# Patient Record
Sex: MALE | Employment: PART TIME | URBAN - METROPOLITAN AREA
[De-identification: names, ages, dates, MRNs, and addresses within clinical notes are randomized per-mention and may not be internally consistent; named-entity substitution may affect disease eponyms.]

---

## 2021-01-06 ENCOUNTER — HOSPITAL ENCOUNTER (EMERGENCY)
Facility: CLINIC | Age: 66
Discharge: HOME | End: 2021-01-06
Attending: EMERGENCY MEDICINE | Admitting: EMERGENCY MEDICINE

## 2021-01-06 VITALS
HEART RATE: 90 BPM | OXYGEN SATURATION: 98 % | TEMPERATURE: 97.9 F | SYSTOLIC BLOOD PRESSURE: 148 MMHG | RESPIRATION RATE: 16 BRPM | DIASTOLIC BLOOD PRESSURE: 107 MMHG

## 2021-01-06 DIAGNOSIS — M17.12 OSTEOARTHRITIS OF LEFT KNEE, UNSPECIFIED OSTEOARTHRITIS TYPE: ICD-10-CM

## 2021-01-06 PROCEDURE — 99283 EMERGENCY DEPT VISIT LOW MDM: CPT

## 2021-01-06 PROCEDURE — 250N000013 HC RX MED GY IP 250 OP 250 PS 637: Performed by: EMERGENCY MEDICINE

## 2021-01-06 RX ORDER — HYDROCODONE BITARTRATE AND ACETAMINOPHEN 5; 325 MG/1; MG/1
2 TABLET ORAL ONCE
Status: COMPLETED | OUTPATIENT
Start: 2021-01-06 | End: 2021-01-06

## 2021-01-06 RX ORDER — HYDROCODONE BITARTRATE AND ACETAMINOPHEN 5; 325 MG/1; MG/1
1-2 TABLET ORAL EVERY 6 HOURS PRN
Qty: 10 TABLET | Refills: 0 | Status: SHIPPED | OUTPATIENT
Start: 2021-01-06

## 2021-01-06 RX ADMIN — HYDROCODONE BITARTRATE AND ACETAMINOPHEN 2 TABLET: 5; 325 TABLET ORAL at 16:40

## 2021-01-06 ASSESSMENT — ENCOUNTER SYMPTOMS
SHORTNESS OF BREATH: 0
COUGH: 0
JOINT SWELLING: 1
CHILLS: 0
ARTHRALGIAS: 1
FEVER: 0

## 2021-01-06 NOTE — ED NOTES
A/O. VSS.  Pt verbalized understanding of d/c instructions and ambulated to lobby steady gait.   Script rx norco given to pt at time of d/c

## 2021-01-06 NOTE — ED TRIAGE NOTES
Patient presents with complaints of bilateral knee pain which has been ongoing for two weeks. Patient states he is unable bend his knees due to pain. Denies any injury to knees. ABC intact without need for intervention at this time.

## 2021-01-06 NOTE — DISCHARGE INSTRUCTIONS
Take tylenol 1000mg 4x per day.  Do not take more than 4000mg tylenol in 24 hours.  You were given a prescription for norco (hydrocodone and acetaminophen).  This is a strong, narcotic pain medication.  It may cause drowsiness and mild changes in cognition.  Do not drive or operate machinery while taking this medication.  Do not mix this medication with alcohol or other sedating medications.  This medication contains tylenol (acetaminophen) therefore do not take additional tylenol (acetaminophen) while taking norco.  This medication can cause constipation.  The use of over the counter laxatives and stool softeners can help prevent this.

## 2021-01-06 NOTE — CONSULTS
Care Management Discharge Note    Discharge Date: 01/06/21       Discharge Disposition: Home    Discharge Services: Pt does not speak English, but he had a friend named Drea on the phone, who translated for him, and I speak a little [place].  Pt does not have Health insurance, the registration lady who is fluent in [place], gave Pt information on how to apply for [employer].  Pt needs to be seen by an orthopedist, I faxed a referral to the [facility], and they will phone Pt back with an appointment time.  I educated Pt that he does not need insurance to be seen at the clinic, he needs to bring US $50.   I gave the Pt information in English and [place] from the [facility] website, explaining about the clinic, where to park, etc.    Discharge DME: None    Discharge Transportation: car, drives self    Private pay costs discussed: $50 for [facility]    Persons Notified of Discharge Plans: Pt and friend Drea  Patient/Family in Agreement with the Plan: yes    Susan Hayes  RN Care Coordinator,  BSN, PHN, CCM, TYESHA  Inpatient Care Coordination - Emergency Department  [facility]   [phone]

## 2021-01-06 NOTE — ED AVS SNAPSHOT
[facility]  [address]  [facility] [address]  Phone: [phone]  Fax: [phone]                                    Rajesh Oropeza   MRN: [id number]    Department: [facility] Emergency Dept   Date of Visit: 1/6/2021           After Visit Summary Signature Page    I have received my discharge instructions, and my questions have been answered. I have discussed any challenges I see with this plan with the nurse or doctor.    ..........................................................................................................................................  Patient/Patient Representative Signature      ..........................................................................................................................................  Patient Representative Print Name and Relationship to Patient    ..................................................               ................................................  Date                                   Time    ..........................................................................................................................................  Reviewed by Signature/Title    ...................................................              ..............................................  Date                                               Time          22EPIC Rev 08/18

## 2021-01-06 NOTE — ED PROVIDER NOTES
History   Chief Complaint:  Knee Injury     The history is provided by the patient. The history is limited by a language barrier. A  was used.      Rajesh Oropeza is a 65 year old male with a history of arthritis, BPH who presents for evaluation of bilateral knee pain that is worse in his left knee than right knee. He reports pain with ambulation and with palpation. He denies recent fall or injury. He states that this pain has present for the past 2 weeks. However, he was seen in October for the same pain at [facility]. X-ray and US was obtained that showed significant degenerative changes and arthritis. No DVT or blood clot. See below. He was given a small prescription of Norco which he is no longer taking. He reports taking Tylenol regularly for pain which is not providing relief. He was also given instruction to follow up with orthopedics which he has not completed. He denies fever, chills, cough, or shortness of breath.    XR Knee 2 Views, left  No acute left knee fracture or dislocation. Mild tricompartmental left knee osteoarthritis. Small left knee joint effusion. No anterior left knee soft tissue swelling. Chronic elongation inferior pole patella.    US Venous Lower Extremity, left  No deep venous thrombosis in the left lower extremity.      Review of Systems   Constitutional: Negative for chills and fever.   Respiratory: Negative for cough and shortness of breath.    Musculoskeletal: Positive for arthralgias, gait problem and joint swelling.   All other systems reviewed and are negative.        Allergies:  Ibuprofen    Medications:  Tylenol    Past Medical History:    BPH  Arthritis     Past Surgical History:    Soft tissue surgery    H/o H.pylori  Colonoscopy    Family History:    Heart disease - brother     Social History:  The patient was unaccompanied to the ED.  PCP: [facility]  Smoking Status: Former Smoker  Smokeless Tobacco: No  Alcohol Use:  No    Physical Exam     Patient Vitals for the past 24 hrs:   BP Temp Temp src Pulse Resp SpO2   01/06/21 1704 -- -- -- 90 16 98 %   01/06/21 1352 (!) 148/107 97.9  F (36.6  C) Temporal 96 18 98 %       Physical Exam  BLE  Right Lower Extremity:  no effusion, 90 degrees flexion, 180 degrees extension, no ligmentous laxity able to initiate extension at the knee, no palpable quadriceps or patella tendon defect,  left Lower Extremity:  small effusion, 90 degrees flexion, 180 degrees extension, no gross ligmnetous laxity able to initiate extension at the knee, no palpable quadriceps or patella tendon defect,    CV: 2+ DP and PT pulses BLE lower ext  Neuro: sensation intact over distal  Skin: no break in skin, no redness or warmth over the knee joints    Emergency Department Course     Emergency Department Course:    Reviewed:  I reviewed the patient's nursing notes, vitals, past medical history and care everywhere.     Assessments:  1611 I performed an exam of the patient in room ED28 as documented above.    Interventions:  1640 Norco, 2 tablets, Oral    Disposition:  The patient was discharged to home.     Impression & Plan   Medical Decision Making:  Rajesh Oropeza is a 65 year old male who presents to the emergency department today for evaluation of bilateral knee pain L>R.  Hx and exam suggestive of osteoarthritis.  Recommended ortho follow up- care coordinator involved.  Recommended tylenol and OTC lidocaine patch.  Small number of PRN norco for break through pain.  Narcotic precautions discussed.      Diagnosis:    ICD-10-CM    1. Osteoarthritis of left knee, unspecified osteoarthritis type  M17.12        Discharge Medication List as of 1/6/2021  5:00 PM      START taking these medications    Details   HYDROcodone-acetaminophen (NORCO) 5-325 MG tablet Take 1-2 tablets by mouth every 6 hours as needed for pain, Disp-10 tablet, R-0, Local Print           Scribe Disclosure:  CARLA, Kayla Bronson, am serving  as a scribe at 4:11 PM on 1/6/2021 to document services personally performed by Milly Rivera MD based on my observations and the provider's statements to me.     This note was completed in part using Dragon voice recognition software. Although reviewed after completion, some word and grammatical errors may occur.      Milly Rivera MD  01/06/21 9831